# Patient Record
(demographics unavailable — no encounter records)

---

## 2025-04-01 NOTE — HISTORY OF PRESENT ILLNESS
[Current] : Current [TextBox_13] : Ms. FLORES is a 60 year old female.   She was called to review eligibility for Low-Dose CT lung cancer screening.  Reviewed and confirmed that the patient meets screening eligibility criteria:   60 years old   Smoking Status: Current Smoker   Number of pack(s) per day: 1 Number of years smoked: 20 Number of pack years smokin   No symptoms of lung cancer, including new cough, change in cough, hemoptysis, and unintentional weight loss.   No personal history of lung cancer.  No lung cancer in a first degree relative.  No history of lung disease or occupational exposures. [PacksperYear] : 20